# Patient Record
Sex: FEMALE | URBAN - METROPOLITAN AREA
[De-identification: names, ages, dates, MRNs, and addresses within clinical notes are randomized per-mention and may not be internally consistent; named-entity substitution may affect disease eponyms.]

---

## 2022-04-06 ENCOUNTER — OFFICE VISIT (OUTPATIENT)
Dept: URBAN - METROPOLITAN AREA CLINIC 23 | Facility: CLINIC | Age: 37
End: 2022-04-06

## 2022-04-06 DIAGNOSIS — H11.001 PTERYGIUM OF RIGHT EYE: ICD-10-CM

## 2022-04-06 DIAGNOSIS — H25.21 AGE-RELATED HYPERMATURE CATARACT OF RIGHT EYE: Primary | ICD-10-CM

## 2022-04-06 PROCEDURE — 99203 OFFICE O/P NEW LOW 30 MIN: CPT | Performed by: OPHTHALMOLOGY

## 2022-04-06 ASSESSMENT — VISUAL ACUITY
OD: LP
OS: 20/20

## 2022-04-06 ASSESSMENT — KERATOMETRY: OS: 42.88

## 2022-04-06 ASSESSMENT — INTRAOCULAR PRESSURE
OD: 10
OS: 14

## 2022-04-06 NOTE — IMPRESSION/PLAN
Impression: Pterygium of right eye: H11.001. Condition: established, worsening. Symptoms: could improve with surgery. Plan: Risks/benefits/alternatives to surgery reviewed. Patient desires surgery, schedule pterygium removal without autograft right eye and RICK, RL1. Patient clear for surgery in [place] [address].

## 2022-05-09 ENCOUNTER — TESTING ONLY (OUTPATIENT)
Dept: URBAN - METROPOLITAN AREA CLINIC 23 | Facility: CLINIC | Age: 37
End: 2022-05-09

## 2022-05-09 DIAGNOSIS — H25.21 AGE-RELATED CATARACT, MORGAGNIAN TYPE, RIGHT EYE: Primary | ICD-10-CM

## 2022-05-09 ASSESSMENT — PACHYMETRY
OS: 4.02
OD: 3.75
OS: 24.83
OD: 21.40

## 2022-05-25 ENCOUNTER — SURGERY (OUTPATIENT)
Dept: URBAN - METROPOLITAN AREA SURGERY 11 | Facility: SURGERY | Age: 37
End: 2022-05-25

## 2022-05-25 PROCEDURE — 66982 XCAPSL CTRC RMVL CPLX WO ECP: CPT | Performed by: OPHTHALMOLOGY

## 2022-05-26 ENCOUNTER — POST-OPERATIVE VISIT (OUTPATIENT)
Dept: URBAN - METROPOLITAN AREA CLINIC 23 | Facility: CLINIC | Age: 37
End: 2022-05-26

## 2022-05-26 DIAGNOSIS — Z48.810 ENCOUNTER FOR SURGICAL AFTERCARE FOLLOWING SURGERY ON A SENSE ORGAN: Primary | ICD-10-CM

## 2022-05-26 PROCEDURE — 99024 POSTOP FOLLOW-UP VISIT: CPT | Performed by: OPTOMETRIST

## 2022-05-26 ASSESSMENT — INTRAOCULAR PRESSURE
OS: 17
OD: 13

## 2022-05-26 NOTE — IMPRESSION/PLAN
Impression: S/P Cataract Extraction by phacoemulsification with IOL placement; Excision of pterygium with or without graft OD - 1 Day. Encounter for surgical aftercare following surgery on a sense organ  Z48.810. Post operative instructions reviewed - Plan: Return as scheduled, sooner if vision suddenly changes or pain increases. Discussed pt. may see floater/Dexycu. Explained that, as we previously discussed, there may be retinal pathology that either requires treatment or may be untreatable. We need to wait until corneal edema resolves and we have a better view of retinal structures to decide on treatment plan.

## 2022-06-01 ENCOUNTER — POST-OPERATIVE VISIT (OUTPATIENT)
Dept: URBAN - METROPOLITAN AREA CLINIC 23 | Facility: CLINIC | Age: 37
End: 2022-06-01

## 2022-06-01 DIAGNOSIS — Z48.810 ENCOUNTER FOR SURGICAL AFTERCARE FOLLOWING SURGERY ON A SENSE ORGAN: Primary | ICD-10-CM

## 2022-06-01 PROCEDURE — 99024 POSTOP FOLLOW-UP VISIT: CPT | Performed by: OPTOMETRIST

## 2022-06-01 NOTE — IMPRESSION/PLAN
Impression: S/P Cataract Extraction by phacoemulsification with IOL placement; Excision of pterygium with or without graft OD - 7 Days. Encounter for surgical aftercare following surgery on a sense organ  Z48.810. Post operative instructions reviewed - Plan: Discussed findings with pt.

## 2022-07-26 ENCOUNTER — OFFICE VISIT (OUTPATIENT)
Dept: URBAN - METROPOLITAN AREA CLINIC 23 | Facility: CLINIC | Age: 37
End: 2022-07-26

## 2022-07-26 DIAGNOSIS — H33.051 TOTAL RETINAL DETACHMENT, RIGHT EYE: Primary | ICD-10-CM

## 2022-07-26 DIAGNOSIS — H26.491 OTHER SECONDARY CATARACT, RIGHT EYE: ICD-10-CM

## 2022-07-26 PROCEDURE — 92014 COMPRE OPH EXAM EST PT 1/>: CPT | Performed by: OPHTHALMOLOGY

## 2022-07-26 PROCEDURE — 92134 CPTRZ OPH DX IMG PST SGM RTA: CPT | Performed by: OPHTHALMOLOGY

## 2022-07-26 RX ORDER — OFLOXACIN 3 MG/ML
0.3 % SOLUTION/ DROPS OPHTHALMIC
Qty: 5 | Refills: 1 | Status: ACTIVE
Start: 2022-07-26

## 2022-07-26 RX ORDER — PREDNISOLONE ACETATE 10 MG/ML
1 % SUSPENSION/ DROPS OPHTHALMIC
Qty: 10 | Refills: 3 | Status: ACTIVE
Start: 2022-07-26

## 2022-07-26 ASSESSMENT — INTRAOCULAR PRESSURE
OS: 15
OD: 16

## 2022-07-26 NOTE — IMPRESSION/PLAN
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 Right. Condition: new problem addtl w/u needed. Vision: vision affected. - Longstanding
s/p PCIOL OD 5/25/22 with Dr. Calvin Martinez: Discussed diagnosis in detail with patient. Discussed risks of progression. Surgical treatment is recommended to repair the retina PPVx, EL, AFx, possible Long Acting gas, possible S. O. RIGHT EYE and removal of PCO RIGHT EYE. Surgical risks and benefits were discussed, explained and understood by patient including (but not limited to) bleeding, pain, infection, loss of vision, loss of eye and possible need for more surgery. Unable to tell how much vision will be recovered. Discussed gas bubble and post-op care: no traveling, flying or high altitude for approximately 6 - 8 weeks. All questions answered. Patient elects to proceed with recommendation. RL1. Educational material provided to patient. OCT and Optos OD shows possible RD. Erx Prednisolone and Ofloxacin to patient's pharmacy. Advise patient that she will not be able to see after the patch is removed on 1 day post-op and will not be able to see for weeks. As previously mentioned, the vision will slowly improve as the gas dissolves and will continue to slowly improve long after the gas is gone.

## 2022-07-26 NOTE — IMPRESSION/PLAN
Impression: Other secondary cataract, right eye: H26.491. Right. Vision: vision affected. Plan: Discussed diagnosis in detail with patient. Recommend PCO removal along with Repair of RD OD - see notes above.

## 2022-10-11 ENCOUNTER — SURGERY (OUTPATIENT)
Dept: URBAN - METROPOLITAN AREA SURGERY 11 | Facility: SURGERY | Age: 37
End: 2022-10-11

## 2022-10-11 PROCEDURE — 67113 REPAIR RETINAL DETACH CPLX: CPT | Performed by: OPHTHALMOLOGY

## 2022-10-12 ENCOUNTER — POST-OPERATIVE VISIT (OUTPATIENT)
Dept: URBAN - METROPOLITAN AREA CLINIC 23 | Facility: CLINIC | Age: 37
End: 2022-10-12

## 2022-10-12 DIAGNOSIS — Z48.810 ENCOUNTER FOR SURGICAL AFTERCARE FOLLOWING SURGERY ON A SENSE ORGAN: Primary | ICD-10-CM

## 2022-10-12 PROCEDURE — 99024 POSTOP FOLLOW-UP VISIT: CPT | Performed by: OPTOMETRIST

## 2022-10-12 ASSESSMENT — INTRAOCULAR PRESSURE
OS: 13
OD: 13

## 2022-10-12 NOTE — IMPRESSION/PLAN
Impression: S/P Repair of Retinal Detachment, PPVX 25g, EL, PCO Removal, Kenalog- [phone] [id number]; Intraocular Injections of Perfluoron and C3F8 12%, AFX OD - 1 Day. Encounter for surgical aftercare following surgery on a sense organ  Z48.810. Plan: Pt edu. Appropriate appearance and IOP. Use AT's PRN OU for comfort. Continue Ofloxacin QID OD and start Pred Acetate QID OD. Advised patient to call with any issues. RTC as scheduled.

## 2022-10-18 ENCOUNTER — POST-OPERATIVE VISIT (OUTPATIENT)
Dept: URBAN - METROPOLITAN AREA CLINIC 23 | Facility: CLINIC | Age: 37
End: 2022-10-18

## 2022-10-18 DIAGNOSIS — Z48.810 ENCOUNTER FOR SURGICAL AFTERCARE FOLLOWING SURGERY ON A SENSE ORGAN: Primary | ICD-10-CM

## 2022-10-18 PROCEDURE — 99024 POSTOP FOLLOW-UP VISIT: CPT | Performed by: OPHTHALMOLOGY

## 2022-10-18 ASSESSMENT — INTRAOCULAR PRESSURE
OS: 15
OD: 20

## 2022-10-18 NOTE — IMPRESSION/PLAN
Impression: S/P Repair of Retinal Detachment, PPVX 25g, EL, PCO Removal, Kenalog- [phone] [id number]; Intraocular Injections of Perfluoron and C3F8 12%, AFX OD - 7 Days. Encounter for surgical aftercare following surgery on a sense organ  Z48.810. Excellent post op course; Condition is improving - Plan: OCT OD shows and Optos OD shows laser tx, retina is attached. Continue Prednisolone QID OD, d/c Ofloxacin. Ok to return to cleaning w/ no strenuous activity, No longer has to sleep upright. Will reassess the retina in 2 weeks - will consider PFO removal at that time.

## 2022-11-03 ENCOUNTER — POST-OPERATIVE VISIT (OUTPATIENT)
Dept: URBAN - METROPOLITAN AREA CLINIC 23 | Facility: CLINIC | Age: 37
End: 2022-11-03

## 2022-11-03 DIAGNOSIS — Z48.810 ENCOUNTER FOR SURGICAL AFTERCARE FOLLOWING SURGERY ON A SENSE ORGAN: Primary | ICD-10-CM

## 2022-11-03 PROCEDURE — 99024 POSTOP FOLLOW-UP VISIT: CPT | Performed by: OPHTHALMOLOGY

## 2022-11-03 ASSESSMENT — INTRAOCULAR PRESSURE
OD: 18
OS: 15

## 2022-11-03 NOTE — IMPRESSION/PLAN
Impression: S/P Repair of Retinal Detachment, PPVX 25g, EL, PCO Removal, Kenalog- [id number] [id number]; Intraocular Injections of Perfluoron and C3F8 12%, AFX OD - 23 Days. Encounter for surgical aftercare following surgery on a sense organ  Z48.810. Excellent post op course; Condition is improving - Plan: Discussed diagnosis in detail with patient. Discussed risks of progression. Exam OD shows the retina is fully attached. Surgical treatment is recommended to remove heavy liquid PPVx Perfluoron Removal. Surgical risks and benefits were discussed, explained and understood by patient. Unable to tell how much vision will be recovered. All questions answered. Patient elects to proceed with recommendation. RL1. Patient has refills for both Ofloxacin and Prednisolone. Continue Prednisolone QID OD. 

H33.051 Total Retinal Detachment, right eye

## 2022-11-23 ENCOUNTER — POST-OPERATIVE VISIT (OUTPATIENT)
Dept: URBAN - METROPOLITAN AREA CLINIC 23 | Facility: CLINIC | Age: 37
End: 2022-11-23

## 2022-11-23 DIAGNOSIS — Z48.810 ENCOUNTER FOR SURGICAL AFTERCARE FOLLOWING SURGERY ON A SENSE ORGAN: Primary | ICD-10-CM

## 2022-11-23 PROCEDURE — 99024 POSTOP FOLLOW-UP VISIT: CPT | Performed by: OPTOMETRIST

## 2022-11-23 RX ORDER — PREDNISOLONE ACETATE 10 MG/ML
1 % SUSPENSION/ DROPS OPHTHALMIC
Qty: 10 | Refills: 3 | Status: ACTIVE
Start: 2022-11-23

## 2022-11-23 ASSESSMENT — INTRAOCULAR PRESSURE
OD: 2
OS: 14

## 2022-11-23 NOTE — IMPRESSION/PLAN
Impression: S/P 84234; Pars Donald Gallon; Perfluoron Removal; AFX (Air Fluid Gas Exchange); PCO removal [id number] OD - 1 Day. Encounter for surgical aftercare following surgery on a sense organ  Z48.810. Plan: Pt edu. Appropriate appearance and IOP. Use AT's PRN OU for comfort. Increase Pred Acetate 6 times a day for 1 week. Continue Ofloxacin QID OD. Advised patient to call with any issues. RTC as scheduled. Ofloxacin QID OD Prednisolone QID OD

## 2022-12-01 ENCOUNTER — POST-OPERATIVE VISIT (OUTPATIENT)
Dept: URBAN - METROPOLITAN AREA CLINIC 23 | Facility: CLINIC | Age: 37
End: 2022-12-01

## 2022-12-01 DIAGNOSIS — Z48.810 ENCOUNTER FOR SURGICAL AFTERCARE FOLLOWING SURGERY ON A SENSE ORGAN: Primary | ICD-10-CM

## 2022-12-01 PROCEDURE — 99024 POSTOP FOLLOW-UP VISIT: CPT | Performed by: OPHTHALMOLOGY

## 2022-12-01 ASSESSMENT — INTRAOCULAR PRESSURE: OD: 19

## 2022-12-01 NOTE — IMPRESSION/PLAN
Impression: S/P 56706; Pars Donald Gallon; Perfluoron Removal; AFX (Air Fluid Gas Exchange); PCO removal [id number] OD - 9 Days. Encounter for surgical aftercare following surgery on a sense organ  Z48.810. Excellent post op course   Post operative instructions reviewed - Condition is improving - Plan: Exam OD shows the retina is attached and stable w/mild edema. OCT OD shows the macula is stable centrally and Optos OD shows the retina is stable. Continue PF OD QID until gone.  Recommend a 1 month po w/Optometrist

## 2023-01-04 ENCOUNTER — POST-OPERATIVE VISIT (OUTPATIENT)
Dept: URBAN - METROPOLITAN AREA CLINIC 23 | Facility: CLINIC | Age: 38
End: 2023-01-04

## 2023-01-04 DIAGNOSIS — Z48.810 ENCOUNTER FOR SURGICAL AFTERCARE FOLLOWING SURGERY ON A SENSE ORGAN: Primary | ICD-10-CM

## 2023-01-04 PROCEDURE — 99024 POSTOP FOLLOW-UP VISIT: CPT | Performed by: OPTOMETRIST

## 2023-01-04 ASSESSMENT — INTRAOCULAR PRESSURE
OD: 26
OS: 10

## 2023-01-04 NOTE — IMPRESSION/PLAN
Impression: S/P 96239; Pars Dulce Glaze; Perfluoron Removal; AFX (Air Fluid Gas Exchange); PCO removal [id number] OD - 43 Days. Encounter for surgical aftercare following surgery on a sense organ  Z48.810. Excellent post op course   Post operative instructions reviewed - Plan: Pt edu. Appropriate appearance and IOP. Use AT's PRN OU for comfort. Advised patient to call with any issues. RTC 3 mo MAC OCT/DFE/OPTOS --Advised patient to use artificial tears for comfort.

## 2023-04-05 ENCOUNTER — OFFICE VISIT (OUTPATIENT)
Dept: URBAN - METROPOLITAN AREA CLINIC 23 | Facility: CLINIC | Age: 38
End: 2023-04-05

## 2023-04-05 DIAGNOSIS — H35.371 PUCKERING OF MACULA, RIGHT EYE: ICD-10-CM

## 2023-04-05 DIAGNOSIS — H11.041 PERIPHERAL PTERYGIUM, STATIONARY, RIGHT EYE: ICD-10-CM

## 2023-04-05 DIAGNOSIS — H26.491 OTHER SECONDARY CATARACT, RIGHT EYE: Primary | ICD-10-CM

## 2023-04-05 PROCEDURE — 99212 OFFICE O/P EST SF 10 MIN: CPT | Performed by: OPTOMETRIST

## 2023-04-05 PROCEDURE — 92134 CPTRZ OPH DX IMG PST SGM RTA: CPT | Performed by: OPTOMETRIST

## 2023-04-05 ASSESSMENT — INTRAOCULAR PRESSURE
OS: 15
OD: 16

## 2023-04-05 ASSESSMENT — KERATOMETRY
OS: 43.38
OD: 43.63

## 2023-04-05 NOTE — IMPRESSION/PLAN
Impression: Other secondary cataract, right eye: H26.491 Right. Plan: Discussed diagnosis in detail with patient. Secondary cataracts affecting vision some, but no surgery is currently recommended. The patient will monitor vision changes and contact us with any decrease in vision. Will continue to monitor.

## 2023-04-05 NOTE — IMPRESSION/PLAN
Impression: Peripheral pterygium, stationary, right eye: H11.041. Plan: PT edu on all findings. Discussed treatment options with patient. No treatment recommended at this time will continue to monitor. Use AT's PRN OU for comfort. Instructed patient to call if symptoms worsen. RTC 1 year.

## 2023-04-05 NOTE — IMPRESSION/PLAN
Impression: Puckering of macula, right eye: H35.371. Plan: Pt edu on all findings. Discussed treatment options with patient. No treatment recommended at this time. Continue to monitor yearly. Instructed patient to call with any sudden vision changes. RTC 6 months DFE with MAC OCT.

## 2023-10-18 ENCOUNTER — OFFICE VISIT (OUTPATIENT)
Dept: URBAN - METROPOLITAN AREA CLINIC 23 | Facility: CLINIC | Age: 38
End: 2023-10-18

## 2023-10-18 DIAGNOSIS — H35.371 PUCKERING OF MACULA, RIGHT EYE: Primary | ICD-10-CM

## 2023-10-18 PROCEDURE — 99212 OFFICE O/P EST SF 10 MIN: CPT | Performed by: OPTOMETRIST

## 2023-10-18 PROCEDURE — 92134 CPTRZ OPH DX IMG PST SGM RTA: CPT | Performed by: OPTOMETRIST

## 2023-10-18 ASSESSMENT — KERATOMETRY
OS: 43.25
OD: 43.75

## 2023-10-18 ASSESSMENT — INTRAOCULAR PRESSURE
OD: 15
OS: 15

## 2024-11-11 ENCOUNTER — OFFICE VISIT (OUTPATIENT)
Dept: URBAN - METROPOLITAN AREA CLINIC 23 | Facility: CLINIC | Age: 39
End: 2024-11-11

## 2024-11-11 DIAGNOSIS — H26.491 OTHER SECONDARY CATARACT, RIGHT EYE: ICD-10-CM

## 2024-11-11 DIAGNOSIS — H11.001 PTERYGIUM OF RIGHT EYE: ICD-10-CM

## 2024-11-11 DIAGNOSIS — H35.371 PUCKERING OF MACULA, RIGHT EYE: Primary | ICD-10-CM

## 2024-11-11 PROCEDURE — 92134 CPTRZ OPH DX IMG PST SGM RTA: CPT | Performed by: OPTOMETRIST

## 2024-11-11 PROCEDURE — 99212 OFFICE O/P EST SF 10 MIN: CPT | Performed by: OPTOMETRIST

## 2024-11-11 ASSESSMENT — KERATOMETRY
OD: 43.38
OS: 43.25

## 2024-11-11 ASSESSMENT — INTRAOCULAR PRESSURE
OS: 15
OD: 21